# Patient Record
Sex: FEMALE | Race: WHITE | NOT HISPANIC OR LATINO | Employment: FULL TIME | URBAN - METROPOLITAN AREA
[De-identification: names, ages, dates, MRNs, and addresses within clinical notes are randomized per-mention and may not be internally consistent; named-entity substitution may affect disease eponyms.]

---

## 2023-03-15 DIAGNOSIS — E05.90 HYPERTHYROIDISM: ICD-10-CM

## 2023-03-15 DIAGNOSIS — Z00.00 HEALTHCARE MAINTENANCE: Primary | ICD-10-CM

## 2023-03-15 RX ORDER — CLOBETASOL PROPIONATE 0.46 MG/ML
SOLUTION TOPICAL
COMMUNITY
Start: 2022-05-04 | End: 2023-03-15 | Stop reason: SDUPTHER

## 2023-03-15 RX ORDER — LEVOTHYROXINE SODIUM 75 UG/1
TABLET ORAL
COMMUNITY
Start: 2022-04-12 | End: 2023-03-15 | Stop reason: SDUPTHER

## 2023-03-15 RX ORDER — CLOBETASOL PROPIONATE 0.46 MG/ML
SOLUTION TOPICAL 2 TIMES DAILY
Qty: 50 ML | Refills: 3 | Status: SHIPPED | OUTPATIENT
Start: 2023-03-15 | End: 2023-05-25

## 2023-03-15 RX ORDER — KETOCONAZOLE 20 MG/ML
SHAMPOO, SUSPENSION TOPICAL
COMMUNITY
Start: 2022-05-04

## 2023-03-15 RX ORDER — LEVOTHYROXINE SODIUM 75 UG/1
75 TABLET ORAL DAILY
Qty: 90 TABLET | Refills: 3 | Status: SHIPPED | OUTPATIENT
Start: 2023-03-15 | End: 2023-11-13

## 2023-05-25 DIAGNOSIS — Z00.00 HEALTHCARE MAINTENANCE: ICD-10-CM

## 2023-05-25 RX ORDER — CLOBETASOL PROPIONATE 0.46 MG/ML
SOLUTION TOPICAL 2 TIMES DAILY
Qty: 50 ML | Refills: 2 | Status: SHIPPED | OUTPATIENT
Start: 2023-05-25

## 2023-11-12 DIAGNOSIS — E05.90 HYPERTHYROIDISM: ICD-10-CM

## 2023-11-13 RX ORDER — LEVOTHYROXINE SODIUM 75 UG/1
TABLET ORAL
Qty: 30 TABLET | Refills: 26 | Status: SHIPPED | OUTPATIENT
Start: 2023-11-13

## 2023-12-16 DIAGNOSIS — N95.1 MENOPAUSAL AND FEMALE CLIMACTERIC STATES: ICD-10-CM

## 2024-01-04 ENCOUNTER — TELEPHONE (OUTPATIENT)
Dept: OBSTETRICS AND GYNECOLOGY | Facility: CLINIC | Age: 49
End: 2024-01-04
Payer: COMMERCIAL

## 2024-01-04 ENCOUNTER — TELEPHONE (OUTPATIENT)
Dept: PRIMARY CARE | Facility: CLINIC | Age: 49
End: 2024-01-04

## 2024-01-05 DIAGNOSIS — Z78.0 MENOPAUSE: Primary | ICD-10-CM

## 2024-01-05 RX ORDER — ESTRADIOL 1 MG/1
1 TABLET ORAL DAILY
Qty: 90 TABLET | Refills: 3 | Status: SHIPPED | OUTPATIENT
Start: 2024-01-05 | End: 2025-01-04

## 2024-01-05 RX ORDER — PROGESTERONE 200 MG/1
CAPSULE ORAL
Qty: 90 CAPSULE | Refills: 3 | Status: SHIPPED | OUTPATIENT
Start: 2024-01-05 | End: 2024-01-09 | Stop reason: SDUPTHER

## 2024-01-05 NOTE — TELEPHONE ENCOUNTER
Pt calling update on estradiol progesterone refill.  Pt is aware nurse will send message to [facility].

## 2024-01-06 RX ORDER — PROGESTERONE 200 MG/1
CAPSULE ORAL
Qty: 14 CAPSULE | Refills: 6 | Status: SHIPPED | OUTPATIENT
Start: 2024-01-06 | End: 2024-06-07 | Stop reason: WASHOUT

## 2024-01-09 ENCOUNTER — TELEPHONE (OUTPATIENT)
Dept: OBSTETRICS AND GYNECOLOGY | Facility: CLINIC | Age: 49
End: 2024-01-09
Payer: COMMERCIAL

## 2024-01-09 DIAGNOSIS — Z78.0 MENOPAUSE: ICD-10-CM

## 2024-01-09 NOTE — TELEPHONE ENCOUNTER
Pt verified by name and .  Pt  is aware nurse call [facility] rx was sent  dn 2024.  Per Pharmacist they do not have it.  Pt is aware nurse sent message to Dr. Jimenez to have her send rx and left message she needs it today.  Pt has no questions.

## 2024-01-10 RX ORDER — PROGESTERONE 200 MG/1
CAPSULE ORAL
Qty: 90 CAPSULE | Refills: 3 | Status: SHIPPED | OUTPATIENT
Start: 2024-01-10 | End: 2024-04-05

## 2024-01-15 ENCOUNTER — APPOINTMENT (OUTPATIENT)
Dept: PRIMARY CARE | Facility: CLINIC | Age: 49
End: 2024-01-15
Payer: COMMERCIAL

## 2024-04-17 DIAGNOSIS — Z79.890 MENOPAUSAL SYNDROME ON HORMONE REPLACEMENT THERAPY: Primary | ICD-10-CM

## 2024-04-17 DIAGNOSIS — N95.1 MENOPAUSAL SYNDROME ON HORMONE REPLACEMENT THERAPY: Primary | ICD-10-CM

## 2024-04-17 RX ORDER — NORETHINDRONE 5 MG/1
5 TABLET ORAL DAILY
Qty: 30 TABLET | Refills: 11 | Status: SHIPPED | OUTPATIENT
Start: 2024-04-17 | End: 2025-04-17

## 2024-06-07 DIAGNOSIS — Z78.0 MENOPAUSE: ICD-10-CM

## 2024-06-07 RX ORDER — PROGESTERONE 200 MG/1
CAPSULE ORAL
Qty: 42 CAPSULE | Refills: 3 | Status: SHIPPED | OUTPATIENT
Start: 2024-06-07 | End: 2024-06-07 | Stop reason: SDUPTHER

## 2024-06-07 RX ORDER — PROGESTERONE 200 MG/1
CAPSULE ORAL
Qty: 42 CAPSULE | Refills: 3 | Status: SHIPPED | OUTPATIENT
Start: 2024-06-07

## 2024-06-30 DIAGNOSIS — Z78.0 MENOPAUSE: ICD-10-CM

## 2024-07-02 RX ORDER — ESTRADIOL 1 MG/1
1 TABLET ORAL DAILY
Qty: 90 TABLET | Refills: 3 | OUTPATIENT
Start: 2024-07-02

## 2024-09-18 ENCOUNTER — LAB (OUTPATIENT)
Dept: LAB | Facility: LAB | Age: 49
End: 2024-09-18
Payer: COMMERCIAL

## 2024-09-18 ENCOUNTER — OFFICE VISIT (OUTPATIENT)
Dept: PRIMARY CARE | Facility: CLINIC | Age: 49
End: 2024-09-18
Payer: COMMERCIAL

## 2024-09-18 VITALS
HEIGHT: 68 IN | SYSTOLIC BLOOD PRESSURE: 119 MMHG | TEMPERATURE: 97.6 F | DIASTOLIC BLOOD PRESSURE: 76 MMHG | WEIGHT: 152.2 LBS | HEART RATE: 70 BPM | BODY MASS INDEX: 23.07 KG/M2

## 2024-09-18 DIAGNOSIS — Z00.00 HEALTHCARE MAINTENANCE: ICD-10-CM

## 2024-09-18 DIAGNOSIS — E03.9 HYPOTHYROIDISM, UNSPECIFIED TYPE: ICD-10-CM

## 2024-09-18 DIAGNOSIS — E05.90 HYPERTHYROIDISM: ICD-10-CM

## 2024-09-18 DIAGNOSIS — Z00.00 HEALTHCARE MAINTENANCE: Primary | ICD-10-CM

## 2024-09-18 DIAGNOSIS — Z12.11 ENCOUNTER FOR SCREENING FOR MALIGNANT NEOPLASM OF COLON: ICD-10-CM

## 2024-09-18 PROBLEM — K21.9 GERD (GASTROESOPHAGEAL REFLUX DISEASE): Status: RESOLVED | Noted: 2024-09-18 | Resolved: 2024-09-18

## 2024-09-18 PROBLEM — R23.8 DRY SCALP: Status: RESOLVED | Noted: 2024-09-18 | Resolved: 2024-09-18

## 2024-09-18 PROBLEM — T14.8XXA BRUISING: Status: RESOLVED | Noted: 2024-09-18 | Resolved: 2024-09-18

## 2024-09-18 LAB
25(OH)D3 SERPL-MCNC: 39 NG/ML (ref 30–100)
ALBUMIN SERPL BCP-MCNC: 4.2 G/DL (ref 3.4–5)
ALP SERPL-CCNC: 31 U/L (ref 33–110)
ALT SERPL W P-5'-P-CCNC: 14 U/L (ref 7–45)
ANION GAP SERPL CALC-SCNC: 13 MMOL/L (ref 10–20)
AST SERPL W P-5'-P-CCNC: 18 U/L (ref 9–39)
BASOPHILS # BLD AUTO: 0.04 X10*3/UL (ref 0–0.1)
BASOPHILS NFR BLD AUTO: 1.1 %
BILIRUB SERPL-MCNC: 0.6 MG/DL (ref 0–1.2)
BUN SERPL-MCNC: 12 MG/DL (ref 6–23)
CALCIUM SERPL-MCNC: 9.2 MG/DL (ref 8.6–10.6)
CHLORIDE SERPL-SCNC: 104 MMOL/L (ref 98–107)
CHOLEST SERPL-MCNC: 165 MG/DL (ref 0–199)
CHOLESTEROL/HDL RATIO: 2.8
CO2 SERPL-SCNC: 25 MMOL/L (ref 21–32)
CREAT SERPL-MCNC: 0.93 MG/DL (ref 0.5–1.05)
EGFRCR SERPLBLD CKD-EPI 2021: 75 ML/MIN/1.73M*2
EOSINOPHIL # BLD AUTO: 0.08 X10*3/UL (ref 0–0.7)
EOSINOPHIL NFR BLD AUTO: 2.3 %
ERYTHROCYTE [DISTWIDTH] IN BLOOD BY AUTOMATED COUNT: 12.2 % (ref 11.5–14.5)
GLUCOSE SERPL-MCNC: 110 MG/DL (ref 74–99)
HCT VFR BLD AUTO: 38.6 % (ref 36–46)
HDLC SERPL-MCNC: 58 MG/DL
HGB BLD-MCNC: 13 G/DL (ref 12–16)
IMM GRANULOCYTES # BLD AUTO: 0 X10*3/UL (ref 0–0.7)
IMM GRANULOCYTES NFR BLD AUTO: 0 % (ref 0–0.9)
LDLC SERPL CALC-MCNC: 94 MG/DL
LYMPHOCYTES # BLD AUTO: 1.41 X10*3/UL (ref 1.2–4.8)
LYMPHOCYTES NFR BLD AUTO: 40.1 %
MCH RBC QN AUTO: 32.7 PG (ref 26–34)
MCHC RBC AUTO-ENTMCNC: 33.7 G/DL (ref 32–36)
MCV RBC AUTO: 97 FL (ref 80–100)
MONOCYTES # BLD AUTO: 0.49 X10*3/UL (ref 0.1–1)
MONOCYTES NFR BLD AUTO: 13.9 %
NEUTROPHILS # BLD AUTO: 1.5 X10*3/UL (ref 1.2–7.7)
NEUTROPHILS NFR BLD AUTO: 42.6 %
NON HDL CHOLESTEROL: 107 MG/DL (ref 0–149)
NRBC BLD-RTO: 0 /100 WBCS (ref 0–0)
PLATELET # BLD AUTO: 298 X10*3/UL (ref 150–450)
POTASSIUM SERPL-SCNC: 3.9 MMOL/L (ref 3.5–5.3)
PROT SERPL-MCNC: 7 G/DL (ref 6.4–8.2)
RBC # BLD AUTO: 3.97 X10*6/UL (ref 4–5.2)
SODIUM SERPL-SCNC: 138 MMOL/L (ref 136–145)
T4 FREE SERPL-MCNC: 1.33 NG/DL (ref 0.78–1.48)
TRIGL SERPL-MCNC: 64 MG/DL (ref 0–149)
TSH SERPL-ACNC: 5.04 MIU/L (ref 0.44–3.98)
VLDL: 13 MG/DL (ref 0–40)
WBC # BLD AUTO: 3.5 X10*3/UL (ref 4.4–11.3)

## 2024-09-18 PROCEDURE — 82306 VITAMIN D 25 HYDROXY: CPT

## 2024-09-18 PROCEDURE — 36415 COLL VENOUS BLD VENIPUNCTURE: CPT

## 2024-09-18 PROCEDURE — 80053 COMPREHEN METABOLIC PANEL: CPT

## 2024-09-18 PROCEDURE — 1036F TOBACCO NON-USER: CPT | Performed by: STUDENT IN AN ORGANIZED HEALTH CARE EDUCATION/TRAINING PROGRAM

## 2024-09-18 PROCEDURE — 84443 ASSAY THYROID STIM HORMONE: CPT

## 2024-09-18 PROCEDURE — 85025 COMPLETE CBC W/AUTO DIFF WBC: CPT

## 2024-09-18 PROCEDURE — 80061 LIPID PANEL: CPT

## 2024-09-18 PROCEDURE — 99396 PREV VISIT EST AGE 40-64: CPT | Performed by: STUDENT IN AN ORGANIZED HEALTH CARE EDUCATION/TRAINING PROGRAM

## 2024-09-18 PROCEDURE — 84439 ASSAY OF FREE THYROXINE: CPT

## 2024-09-18 PROCEDURE — 3008F BODY MASS INDEX DOCD: CPT | Performed by: STUDENT IN AN ORGANIZED HEALTH CARE EDUCATION/TRAINING PROGRAM

## 2024-09-18 RX ORDER — LEVOTHYROXINE SODIUM 75 UG/1
75 TABLET ORAL DAILY
Qty: 90 TABLET | Refills: 3 | Status: SHIPPED | OUTPATIENT
Start: 2024-09-18 | End: 2025-09-18

## 2024-09-18 NOTE — PROGRESS NOTES
Subjective   Patient ID: Camelia Guzmán is a 49 y.o. female who presents for Annual Exam.    HPI   Has been ~2 years since last in with me. Intermittent fasting.     Re: hypothyroidism - asx on treatment. Due for lab work.    Re: skin - see derm notes. On a few creams. Stable.    Re: HM - due for CRS. Due for mamm. Declines all shots.     PMHx, FHx, Social Hx, Surg Hx personally reviewed at this appointment. No pertinent findings and/or changes from prior (if applicable).    ROS: Denies wt gain/loss f/c HA LoC CP SOB NVDC. See HPI above, and scanned sheet (if applicable). All other systems are reviewed and are without complaint.     Review of Systems    Objective   /76   Pulse 70   Temp 36.4 °C (97.6 °F)   Wt 69 kg (152 lb 3.2 oz)   BMI 22.48 kg/m²     Physical Exam  Gen: well developed in NAD. AAO x3.  HEENT: NC/AT. Anicteric sclera, symmetric pupils. MMM no thrush.  Neck: Soft, supple. No LAD. No goiter.   CV: RRR nl s1s2 no m/r/g  Pulm: CTAB no w/r/r, good air exchange  GI: soft NTND BS+ no hsm  Ext: WWP no edema  Neuro: II-XII grossly intact, nonfocal systemic findings  MSK: 5/5 strength b/l UE and LE  Gait: unremarkable     Lab Results   Component Value Date    WBC 5.8 08/25/2022    HGB 13.0 08/25/2022    HCT 39.4 08/25/2022     08/25/2022    CHOL 164 04/12/2022    TRIG 114 04/12/2022    HDL 56.7 04/12/2022    ALT 15 08/25/2022    AST 19 08/25/2022     08/25/2022    K 4.2 08/25/2022     08/25/2022    CREATININE 0.76 08/25/2022    BUN 14 08/25/2022    CO2 30 08/25/2022    TSH 4.26 (H) 08/25/2022    INR 1.0 08/25/2022     par    BI mammo bilateral screening tomosynthesis  Narrative: Interpreted By:  BALTA VARGHESE MD  MRN: [id number]  Patient Name: CAEMLIA GUZMÁN     STUDY:  DIGITAL MAMM SCREENING W/ RUPESH;  5/22/2023 3:20 pm     ACCESSION NUMBER(S):  [id number]     ORDERING CLINICIAN:  BOBBI GRAY     INDICATION:  Screening.     COMPARISON:  New baseline.     FINDINGS:  2D and  tomosynthesis images were reviewed at 1 mm slice thickness.     The breast tissue is heterogeneously dense, which may obscure small  masses.  Bilateral retropectoral silicone implants are in place;  standard and implant displaced views were obtained. No suspicious  masses or calcifications are identified.     Impression: No mammographic evidence of malignancy.     BI-RADS CATEGORY:     Category: 1 - Negative.  Recommendation: 1 Year Screening.     For any future breast imaging appointments, please call [phone]  ([phone]).     Patient letter sent SNORM            Current Outpatient Medications   Medication Instructions    clobetasol (Temovate) 0.05 % external solution Topical, 2 times daily    estradiol (ESTRACE) 1 mg, oral, Daily, as directed    ketoconazole (NIZOral) 2 % shampoo APPLY AS DIRECTED to affeced areas once daily    levothyroxine (SYNTHROID, LEVOXYL) 75 mcg, oral, Daily, Take on an empty stomach at the same time each day, either 30 to 60 minutes prior to breakfast    norethindrone (AYGESTIN) 5 mg, oral, Daily    progesterone (Prometrium) 200 mg capsule TAKE 1 CAPSULE BY MOUTH AT BEDTIME FOR 14 DAYS THEN STOP FOR 14 DAYS THEN REPEAT      Assessment/Plan   Doing well since last in.    # hypothyroidism  - TSH with reflex to free T4  - will adjust meds based on lab values     # Skin conditions  - continue current topicals    # Health Maintenance  - routine blood work  - Colon Cancer Screening: due, ordered today (cologuard)  - Mammogram: pt declines  - DEXA: Not yet indicated   - Immunizations: not indicated

## 2024-09-18 NOTE — PATIENT INSTRUCTIONS
Please stop at any  lab ([address] if you choose to use my building) to complete your blood and/or urine work that I've ordered for you.    I will contact you with the results at my soonest convenience. I strongly urge you to use [facility] [facility] as this is the quickest and easiest way to access your results and receive my correspondences.     I have renewed your chronic medications today.     I have ordered Cologuard to screen you for colon cancer. You will receive a kit at home.    I strongly recommend getting your mammogram.    See me yearly for a complete physical exam, and sooner as needed for sick visits

## 2024-09-26 LAB — NONINV COLON CA DNA+OCC BLD SCRN STL QL: NEGATIVE

## 2024-10-28 ENCOUNTER — APPOINTMENT (OUTPATIENT)
Dept: OBSTETRICS AND GYNECOLOGY | Facility: CLINIC | Age: 49
End: 2024-10-28

## 2024-11-07 ENCOUNTER — APPOINTMENT (OUTPATIENT)
Dept: OBSTETRICS AND GYNECOLOGY | Facility: CLINIC | Age: 49
End: 2024-11-07

## 2025-01-09 ENCOUNTER — APPOINTMENT (OUTPATIENT)
Dept: OBSTETRICS AND GYNECOLOGY | Facility: CLINIC | Age: 50
End: 2025-01-09

## 2025-03-19 ENCOUNTER — APPOINTMENT (OUTPATIENT)
Dept: OBSTETRICS AND GYNECOLOGY | Facility: CLINIC | Age: 50
End: 2025-03-19

## 2025-03-19 VITALS
DIASTOLIC BLOOD PRESSURE: 68 MMHG | BODY MASS INDEX: 22.64 KG/M2 | HEIGHT: 68 IN | WEIGHT: 149.4 LBS | SYSTOLIC BLOOD PRESSURE: 110 MMHG

## 2025-03-19 DIAGNOSIS — Z78.0 MENOPAUSE: ICD-10-CM

## 2025-03-19 DIAGNOSIS — Z12.31 BREAST CANCER SCREENING BY MAMMOGRAM: ICD-10-CM

## 2025-03-19 DIAGNOSIS — Z01.419 WELL WOMAN EXAM WITH ROUTINE GYNECOLOGICAL EXAM: Primary | ICD-10-CM

## 2025-03-19 DIAGNOSIS — N95.1 PERIMENOPAUSAL: ICD-10-CM

## 2025-03-19 PROCEDURE — 1036F TOBACCO NON-USER: CPT | Performed by: NURSE PRACTITIONER

## 2025-03-19 PROCEDURE — 99396 PREV VISIT EST AGE 40-64: CPT | Performed by: NURSE PRACTITIONER

## 2025-03-19 PROCEDURE — 3008F BODY MASS INDEX DOCD: CPT | Performed by: NURSE PRACTITIONER

## 2025-03-19 RX ORDER — ESTRADIOL 1 MG/1
1 TABLET ORAL DAILY
Qty: 90 TABLET | Refills: 3 | Status: SHIPPED | OUTPATIENT
Start: 2025-03-19

## 2025-03-19 RX ORDER — PROGESTERONE 200 MG/1
CAPSULE ORAL
Qty: 42 CAPSULE | Refills: 3 | Status: SHIPPED | OUTPATIENT
Start: 2025-03-19

## 2025-03-19 ASSESSMENT — ENCOUNTER SYMPTOMS
GASTROINTESTINAL NEGATIVE: 0
CARDIOVASCULAR NEGATIVE: 0
ENDOCRINE NEGATIVE: 0
NEUROLOGICAL NEGATIVE: 0
ALLERGIC/IMMUNOLOGIC NEGATIVE: 0
MUSCULOSKELETAL NEGATIVE: 0
HEMATOLOGIC/LYMPHATIC NEGATIVE: 0
PSYCHIATRIC NEGATIVE: 0
EYES NEGATIVE: 0
RESPIRATORY NEGATIVE: 0
CONSTITUTIONAL NEGATIVE: 0

## 2025-03-19 ASSESSMENT — PATIENT HEALTH QUESTIONNAIRE - PHQ9
1. LITTLE INTEREST OR PLEASURE IN DOING THINGS: NOT AT ALL
2. FEELING DOWN, DEPRESSED OR HOPELESS: NOT AT ALL
SUM OF ALL RESPONSES TO PHQ9 QUESTIONS 1 & 2: 0

## 2025-03-19 ASSESSMENT — PAIN SCALES - GENERAL: PAINLEVEL_OUTOF10: 0-NO PAIN

## 2025-03-19 NOTE — PROGRESS NOTES
Subjective   Patient ID: Camelia Guzmán is a 49 y.o. female who presents for Annual Exam (Pap 2023).  HPI  Menopausal age: perimenopausal   Menses monthly with 4 days of VB  No dysmenorrhea     Any Contraindications to HT: personal h/o breast cancer, estrogen sensitive cancer, dementia, stroke, MI, VTE or inherited high risk for VTE, SCAD: none  h/o congenital heart disease (increased risk of DVT) none     contraception: none  HT: 1mg po estradiol  use of OTC remedies: none  bioidenticals: none        VMS: none  Sleep difficulties: none  Mood changes: improved  Joint pain: mild  Brain fog/difficulty concentrating: none     GSM: none     sexually active: yes  dyspareunia: none  difficulty reaching orgasm: none     no h/o abnormal pap screenings     no trouble leaking urine  vaginal hygiene: soap-organic     exercise: yes; Pilates and yoga    FH breast cancer: none  FH ovarian cancer: none  FH colon cancer: none    FH osteoporosis: none  Review of Systems    Objective   Physical Exam  Vitals and nursing note reviewed.   Constitutional:       Appearance: Normal appearance.   Cardiovascular:      Rate and Rhythm: Normal rate and regular rhythm.      Heart sounds: Normal heart sounds.   Pulmonary:      Effort: Pulmonary effort is normal.      Breath sounds: Normal breath sounds.   Chest:   Breasts:     Right: Normal.      Left: Normal.   Abdominal:      Tenderness: There is no abdominal tenderness.   Genitourinary:     General: Normal vulva.      Exam position: Lithotomy position.      Labia:         Right: No lesion.         Left: No lesion.    Skin:     General: Skin is warm and dry.   Neurological:      Mental Status: She is alert.   Psychiatric:         Attention and Perception: Attention normal.         Mood and Affect: Mood normal.         Speech: Speech normal.         Behavior: Behavior normal.         Thought Content: Thought content normal.         Cognition and Memory: Cognition and memory normal.          Judgment: Judgment normal.         Assessment/Plan   Diagnoses and all orders for this visit:  Well woman exam with routine gynecological exam  Menopause  -     estradiol (Estrace) 1 mg tablet; Take 1 tablet (1 mg) by mouth once daily. as directed  -     progesterone (Prometrium) 200 mg capsule; TAKE 1 CAPSULE BY MOUTH AT BEDTIME FOR 14 DAYS THEN STOP FOR 14 DAYS THEN REPEAT  Breast cancer screening by mammogram  -     BI mammo bilateral screening tomosynthesis; Future  Perimenopausal       Follow up annually or PRN; pap test next year    HAVEN Malloy-CNP 03/19/25 10:14 AM

## 2025-06-18 ENCOUNTER — TELEPHONE (OUTPATIENT)
Dept: OBSTETRICS AND GYNECOLOGY | Facility: CLINIC | Age: 50
End: 2025-06-18

## 2025-06-18 DIAGNOSIS — Z78.0 MENOPAUSE: ICD-10-CM

## 2025-06-18 DIAGNOSIS — E05.90 HYPERTHYROIDISM: ICD-10-CM

## 2025-06-18 RX ORDER — LEVOTHYROXINE SODIUM 75 UG/1
75 TABLET ORAL DAILY
Qty: 90 TABLET | Refills: 3 | Status: SHIPPED | OUTPATIENT
Start: 2025-06-18 | End: 2026-06-18

## 2025-06-18 RX ORDER — ESTRADIOL 1 MG/1
1 TABLET ORAL DAILY
Qty: 90 TABLET | Refills: 3 | Status: SHIPPED | OUTPATIENT
Start: 2025-06-18

## 2025-06-18 NOTE — TELEPHONE ENCOUNTER
Pt verified by name and .  Pt is aware nurse sent message to [facility] to refill her estradiol and progesterone.  [facility]  Pt has no questions at this time.

## 2025-07-24 ENCOUNTER — OFFICE VISIT (OUTPATIENT)
Dept: PRIMARY CARE | Facility: CLINIC | Age: 50
End: 2025-07-24

## 2025-07-24 VITALS
BODY MASS INDEX: 22.2 KG/M2 | HEART RATE: 76 BPM | WEIGHT: 146 LBS | SYSTOLIC BLOOD PRESSURE: 119 MMHG | DIASTOLIC BLOOD PRESSURE: 77 MMHG | TEMPERATURE: 98.6 F

## 2025-07-24 DIAGNOSIS — S39.012A STRAIN OF LUMBAR REGION, INITIAL ENCOUNTER: Primary | ICD-10-CM

## 2025-07-24 PROCEDURE — 99214 OFFICE O/P EST MOD 30 MIN: CPT | Performed by: STUDENT IN AN ORGANIZED HEALTH CARE EDUCATION/TRAINING PROGRAM

## 2025-07-24 RX ORDER — IBUPROFEN 800 MG/1
800 TABLET, FILM COATED ORAL 3 TIMES DAILY PRN
Qty: 60 TABLET | Refills: 0 | Status: SHIPPED | OUTPATIENT
Start: 2025-07-24 | End: 2025-09-22

## 2025-07-24 RX ORDER — CYCLOBENZAPRINE HCL 10 MG
10 TABLET ORAL NIGHTLY PRN
Qty: 30 TABLET | Refills: 0 | Status: SHIPPED | OUTPATIENT
Start: 2025-07-24 | End: 2025-09-22

## 2025-07-24 NOTE — PROGRESS NOTES
Subjective   Patient ID: Camelia Guzmán is a 50 y.o. female who presents for No chief complaint on file..  History of Present Illness  Camelia Guzmán is a 50 year old female who presents with severe lower back pain following a fall.    She fell from a foldable ladder while painting a ceiling, landing on her buttocks from a height approximately equal to her own. Initially, there was no significant pain, but severe pain developed the following day, particularly at night, making breathing difficult and limiting mobility. The pain is stabbing and agonizing, located in the lower back and slightly lower, with no radiation. It is exacerbated by certain positions and movements, especially when turning in bed. She was unable to move for two days due to the intensity of the pain.    She is very active, engaging in daily workouts and stretching routines, which have been significantly impacted. She can walk but cannot stand straight and feels pressure when standing. The pain is most severe when lying in bed, especially when trying to turn.      PMHx, FHx, Social Hx, Surg Hx personally reviewed at this appointment. No pertinent findings and/or changes from prior (if applicable).    ROS: Unless specified above, pt denies wt gain/loss f/c HA LoC CP SOB NVDC. See HPI above, and scanned sheet (if applicable). All other systems are reviewed and are without complaint.     Objective     /77   Pulse 76   Temp 37 °C (98.6 °F)   Wt 66.2 kg (146 lb)   LMP 05/10/2023   BMI 22.20 kg/m²      Physical Exam  Gen: well appearing, NAD. AAO x 3.  HEENT: NC/AT. Symmetric pupils  CV: RRR nl s1s2 no m/r/g  Pulm: CTAB no w/r/r  GI: soft NTND  Neuro: grossly nonfocal  MSK: Mild lumbar paraspinal tightness. Otherwise unremarkable.   Gait: unremarkable     Current Outpatient Medications   Medication Instructions    clobetasol (Temovate) 0.05 % external solution Topical, 2 times daily    cyclobenzaprine (FLEXERIL) 10 mg, oral, Nightly PRN     estradiol (ESTRACE) 1 mg, oral, Daily, as directed    ibuprofen 800 mg, oral, 3 times daily PRN    ketoconazole (NIZOral) 2 % shampoo APPLY AS DIRECTED to affeced areas once daily    levothyroxine (SYNTHROID, LEVOXYL) 75 mcg, oral, Daily, Take on an empty stomach at the same time each day, either 30 to 60 minutes prior to breakfast    progesterone (Prometrium) 200 mg capsule TAKE 1 CAPSULE BY MOUTH AT BEDTIME FOR 14 DAYS THEN STOP FOR 14 DAYS THEN REPEAT        Lab Results   Component Value Date    WBC 3.5 (L) 09/18/2024    HGB 13.0 09/18/2024    HCT 38.6 09/18/2024     09/18/2024    CHOL 165 09/18/2024    TRIG 64 09/18/2024    HDL 58.0 09/18/2024    ALT 14 09/18/2024    AST 18 09/18/2024     09/18/2024    K 3.9 09/18/2024     09/18/2024    CREATININE 0.93 09/18/2024    BUN 12 09/18/2024    CO2 25 09/18/2024    TSH 5.04 (H) 09/18/2024    INR 1.0 08/25/2022     par     BI mammo bilateral screening tomosynthesis  Narrative: Interpreted By:  BALTA VARGHESE MD  MRN: [id number]  Patient Name: BETH BEGUM     STUDY:  DIGITAL MAMM SCREENING W/ RUPESH;  5/22/2023 3:20 pm     ACCESSION NUMBER(S):  [id number]     ORDERING CLINICIAN:  BOBBI GRAY     INDICATION:  Screening.     COMPARISON:  New baseline.     FINDINGS:  2D and tomosynthesis images were reviewed at 1 mm slice thickness.     The breast tissue is heterogeneously dense, which may obscure small  masses.  Bilateral retropectoral silicone implants are in place;  standard and implant displaced views were obtained. No suspicious  masses or calcifications are identified.     Impression: No mammographic evidence of malignancy.     BI-RADS CATEGORY:     Category: 1 - Negative.  Recommendation: 1 Year Screening.     For any future breast imaging appointments, please call [phone]).     Patient letter sent SNORM               Assessment & Plan  Acute Low Back Pain with Muscle Spasm  Pain and muscle spasm following a fall. No fracture or  bruising. Symptoms improving. Muscle spasm likely primary cause.   - Prescribed high-dose ibuprofen for pain.  - Prescribed muscle relaxant for bedtime use on tight days.  - Advised against immediate MRI due to cost and insurance will deny, suggested physical therapy first.  - She wishes to pursue this as a cash-pay, script provided.          Lalit Baptiste MD       This medical note was created with the assistance of artificial intelligence (AI) for documentation purposes. The content has been reviewed and confirmed by the healthcare provider for accuracy and completeness. Patient consented to the use of audio recording and use of AI during their visit.

## 2025-07-24 NOTE — PATIENT INSTRUCTIONS
You are dealing with a back strain. The mainstay of treatment is conservative management with ice/heat, stretching, physical therapy, and acupuncture or massage therapy. Given the degree of pain you're having, I may have provided you with a short course of NSAIDs and a small supply of a muscle relaxant. There is likely no role for imaging (x-ray, CT scan, MRI, etc...) at this time; if you aren't better with the above treatments, return to clinic and I will reconsider.

## 2025-07-25 ENCOUNTER — TELEPHONE (OUTPATIENT)
Dept: OBSTETRICS AND GYNECOLOGY | Facility: CLINIC | Age: 50
End: 2025-07-25